# Patient Record
Sex: MALE | Race: WHITE | NOT HISPANIC OR LATINO | Employment: OTHER | URBAN - METROPOLITAN AREA
[De-identification: names, ages, dates, MRNs, and addresses within clinical notes are randomized per-mention and may not be internally consistent; named-entity substitution may affect disease eponyms.]

---

## 2017-04-05 ENCOUNTER — TELEPHONE (OUTPATIENT)
Dept: CARDIOLOGY | Facility: MEDICAL CENTER | Age: 72
End: 2017-04-05

## 2017-04-05 ENCOUNTER — OFFICE VISIT (OUTPATIENT)
Dept: CARDIOLOGY | Facility: CLINIC | Age: 72
End: 2017-04-05
Payer: MEDICARE

## 2017-04-05 VITALS
HEART RATE: 68 BPM | WEIGHT: 180 LBS | SYSTOLIC BLOOD PRESSURE: 100 MMHG | DIASTOLIC BLOOD PRESSURE: 58 MMHG | HEIGHT: 68 IN | BODY MASS INDEX: 27.28 KG/M2

## 2017-04-05 DIAGNOSIS — I48.0 PAROXYSMAL ATRIAL FIBRILLATION (HCC): ICD-10-CM

## 2017-04-05 DIAGNOSIS — I21.29 MYOCARDIAL INFARCTION INVOLVING OTHER CORONARY ARTERY: ICD-10-CM

## 2017-04-05 DIAGNOSIS — I25.10 CORONARY ARTERY DISEASE INVOLVING NATIVE CORONARY ARTERY OF NATIVE HEART WITHOUT ANGINA PECTORIS: ICD-10-CM

## 2017-04-05 DIAGNOSIS — I31.0: ICD-10-CM

## 2017-04-05 PROCEDURE — 3017F COLORECTAL CA SCREEN DOC REV: CPT | Mod: 8P | Performed by: INTERNAL MEDICINE

## 2017-04-05 PROCEDURE — G8598 ASA/ANTIPLAT THER USED: HCPCS | Performed by: INTERNAL MEDICINE

## 2017-04-05 PROCEDURE — G8432 DEP SCR NOT DOC, RNG: HCPCS | Performed by: INTERNAL MEDICINE

## 2017-04-05 PROCEDURE — 1036F TOBACCO NON-USER: CPT | Performed by: INTERNAL MEDICINE

## 2017-04-05 PROCEDURE — 4040F PNEUMOC VAC/ADMIN/RCVD: CPT | Performed by: INTERNAL MEDICINE

## 2017-04-05 PROCEDURE — G8419 CALC BMI OUT NRM PARAM NOF/U: HCPCS | Performed by: INTERNAL MEDICINE

## 2017-04-05 PROCEDURE — 1101F PT FALLS ASSESS-DOCD LE1/YR: CPT | Mod: 8P | Performed by: INTERNAL MEDICINE

## 2017-04-05 PROCEDURE — 99214 OFFICE O/P EST MOD 30 MIN: CPT | Performed by: INTERNAL MEDICINE

## 2017-04-05 ASSESSMENT — ENCOUNTER SYMPTOMS
EYES NEGATIVE: 1
COUGH: 0
BRUISES/BLEEDS EASILY: 0
DIZZINESS: 0
HEADACHES: 0
NERVOUS/ANXIOUS: 0
NAUSEA: 0
MYALGIAS: 0
INSOMNIA: 0
SHORTNESS OF BREATH: 0
PALPITATIONS: 0
HEARTBURN: 0
WEIGHT LOSS: 0
DEPRESSION: 0
LOSS OF CONSCIOUSNESS: 0

## 2017-04-05 NOTE — Clinical Note
[facility] [address]  Phone: [phone]  Fax: [phone]              Roger Chavira  1945    Encounter Date: 4/5/2017    Lauren Aleman M.D.          PROGRESS NOTE:  Subjective:   Roger Chavira is a 71 y.o. male who presents today in followup in regards to CAD with MI 2007 with RCA PCI and viral pericarditis 2016 with pAF.    Doing well , He and his wife are still living in [place]. They gave their flower store to their daughter. Enjoying their vacation. No setbacks. Taking meds as directed          Past Medical History   Diagnosis Date   • CAD (coronary artery disease) 11/15/2011   • MI (myocardial infarction) (CMS-[facility]) 11/15/2011   • Stented coronary artery 11/15/2011   • HTN (hypertension) 11/15/2011   • Dyslipidemia 11/15/2011   • Former smoker 11/15/2011     History reviewed. No pertinent past surgical history.  Family History   Problem Relation Age of Onset   • Heart Attack Neg Hx    • Heart Disease Neg Hx    • Heart Failure Neg Hx      History   Smoking status   • Former Smoker   • Quit date: 03/20/2007   Smokeless tobacco   • Never Used     Allergies   Allergen Reactions   • Hydrocodone Vomiting     Pt had vomiting when he took vicodin.       Outpatient Encounter Prescriptions as of 4/5/2017   Medication Sig Dispense Refill   • budesonide-formoterol (SYMBICORT) 160-4.5 MCG/ACT Aerosol Inhale 2 Puffs by mouth 2 Times a Day. 1 Inhaler 0   • albuterol (VENTOLIN OR PROVENTIL) 108 (90 BASE) MCG/ACT Aero Soln inhalation aerosol Inhale 2 Puffs by mouth every 6 hours as needed for Shortness of Breath. 8.5 g 3   • acyclovir (ZOVIRAX) 5 % Ointment Apply 1 Application to affected area(s) every 3 hours as needed.     • levofloxacin (LEVAQUIN) 750 MG tablet Take 750 mg by mouth every day. 5 day course started 2/23/16     • Famotidine-Ca Carb-Mag Hydrox -165 MG Chew Tab Take 1 Tab by mouth 1 time daily as needed.     "  • atorvastatin (LIPITOR) 10 MG Tab Take 10 mg by mouth every 48 hours.     • levothyroxine (SYNTHROID) 25 MCG Tab Take 25 mcg by mouth Every morning on an empty stomach.     • methotrexate (TREXALL) 2.5 MG TABS Take 7.5 mg by mouth every Saturday. 3 TABLETS     • aspirin 81 MG tablet Take 81 mg by mouth every day.     • Ramipril 10 MG TABS Take  by mouth every morning.     • alprazolam (XANAX) 0.5 MG TABS Take 0.5 mg by mouth 3 times a day as needed.       No facility-administered encounter medications on file as of 4/5/2017.     Review of Systems   Constitutional: Negative for weight loss and malaise/fatigue.   Eyes: Negative.    Respiratory: Negative for cough and shortness of breath.    Cardiovascular: Negative for chest pain, palpitations and leg swelling.   Gastrointestinal: Negative for heartburn and nausea.   Musculoskeletal: Positive for joint pain (stable with RA). Negative for myalgias.   Skin:        Still on meds for probable skin cancer on face   Neurological: Negative for dizziness, loss of consciousness and headaches.   Endo/Heme/Allergies: Does not bruise/bleed easily.   Psychiatric/Behavioral: Negative for depression. The patient is not nervous/anxious and does not have insomnia.    All other systems reviewed and are negative.       Objective:   /58 mmHg  Pulse 68  Ht 1.727 m (5' 8\")  Wt 81.647 kg (180 lb)  BMI 27.38 kg/m2    Physical Exam    Assessment:     1. Coronary artery disease involving native coronary artery of native heart without angina pectoris     2. Myocardial infarction involving other coronary artery (CMS-HCC)     3. Paroxysmal atrial fibrillation (CMS-HCC)     4. Chronic adhesive viral pericarditis         Medical Decision Making:  Today's Assessment / Status / Plan:     He got bloodwork today, I will call to get it.     Coronary disease, stable. Moderate disease on CAT scan 2016. Asymptomatic, On an aspirin, intolerant of statin    AF, quiescengt after pericarditis, " normal exam today    I looked at and read echo from today -- normal & reassuring    RTC 6m, sooner with concerns      Usama Frost M.D.  [address] [place] [id number]  VIA Facsimile: [phone]

## 2017-04-05 NOTE — PROGRESS NOTES
Subjective:   Roger Chavira is a 71 y.o. male who presents today in followup in regards to CAD with MI 2007 with RCA PCI and viral pericarditis 2016 with pAF.    Doing well , He and his wife are still living in [place]. They gave their flower store to their daughter. Enjoying their vacation. No setbacks. Taking meds as directed          Past Medical History   Diagnosis Date   • CAD (coronary artery disease) 11/15/2011   • MI (myocardial infarction) (CMS-[facility]) 11/15/2011   • Stented coronary artery 11/15/2011   • HTN (hypertension) 11/15/2011   • Dyslipidemia 11/15/2011   • Former smoker 11/15/2011     History reviewed. No pertinent past surgical history.  Family History   Problem Relation Age of Onset   • Heart Attack Neg Hx    • Heart Disease Neg Hx    • Heart Failure Neg Hx      History   Smoking status   • Former Smoker   • Quit date: 03/20/2007   Smokeless tobacco   • Never Used     Allergies   Allergen Reactions   • Hydrocodone Vomiting     Pt had vomiting when he took vicodin.       Outpatient Encounter Prescriptions as of 4/5/2017   Medication Sig Dispense Refill   • budesonide-formoterol (SYMBICORT) 160-4.5 MCG/ACT Aerosol Inhale 2 Puffs by mouth 2 Times a Day. 1 Inhaler 0   • albuterol (VENTOLIN OR PROVENTIL) 108 (90 BASE) MCG/ACT Aero Soln inhalation aerosol Inhale 2 Puffs by mouth every 6 hours as needed for Shortness of Breath. 8.5 g 3   • acyclovir (ZOVIRAX) 5 % Ointment Apply 1 Application to affected area(s) every 3 hours as needed.     • levofloxacin (LEVAQUIN) 750 MG tablet Take 750 mg by mouth every day. 5 day course started 2/23/16     • Famotidine-Ca Carb-Mag Hydrox -165 MG Chew Tab Take 1 Tab by mouth 1 time daily as needed.     • atorvastatin (LIPITOR) 10 MG Tab Take 10 mg by mouth every 48 hours.     • levothyroxine (SYNTHROID) 25 MCG Tab Take 25 mcg by mouth Every morning on an empty stomach.     • methotrexate (TREXALL) 2.5 MG TABS Take 7.5 mg by mouth every Saturday. 3 TABLETS     •  "aspirin 81 MG tablet Take 81 mg by mouth every day.     • Ramipril 10 MG TABS Take  by mouth every morning.     • alprazolam (XANAX) 0.5 MG TABS Take 0.5 mg by mouth 3 times a day as needed.       No facility-administered encounter medications on file as of 4/5/2017.     Review of Systems   Constitutional: Negative for weight loss and malaise/fatigue.   Eyes: Negative.    Respiratory: Negative for cough and shortness of breath.    Cardiovascular: Negative for chest pain, palpitations and leg swelling.   Gastrointestinal: Negative for heartburn and nausea.   Musculoskeletal: Positive for joint pain (stable with RA). Negative for myalgias.   Skin:        Still on meds for probable skin cancer on face   Neurological: Negative for dizziness, loss of consciousness and headaches.   Endo/Heme/Allergies: Does not bruise/bleed easily.   Psychiatric/Behavioral: Negative for depression. The patient is not nervous/anxious and does not have insomnia.    All other systems reviewed and are negative.       Objective:   /58 mmHg  Pulse 68  Ht 1.727 m (5' 8\")  Wt 81.647 kg (180 lb)  BMI 27.38 kg/m2    Physical Exam    Assessment:     1. Coronary artery disease involving native coronary artery of native heart without angina pectoris     2. Myocardial infarction involving other coronary artery (CMS-HCC)     3. Paroxysmal atrial fibrillation (CMS-HCC)     4. Chronic adhesive viral pericarditis         Medical Decision Making:  Today's Assessment / Status / Plan:     He got bloodwork today, I will call to get it.     Coronary disease, stable. Moderate disease on CAT scan 2016. Asymptomatic, On an aspirin, intolerant of statin    AF, quiescengt after pericarditis, normal exam today    I looked at and read echo from today -- normal & reassuring    RTC 6m, sooner with concerns  "

## 2017-04-07 NOTE — TELEPHONE ENCOUNTER
Called patient regarding lab results. He is not taking Lipitor because he had muscle aches on the medication. He also states that he cannot take Crestor, he took that medication years ago and he had muscle aches and weakness on that medication.    Forwarded to Dr. Aleman to review.    NESTOR ZHENG

## 2017-04-07 NOTE — TELEPHONE ENCOUNTER
Called patient and advised him that he does not need to start any new medication at this time.    NESTOR ZHENG

## 2017-04-07 NOTE — TELEPHONE ENCOUNTER
Labs are generally good unfortunately lipids and glucose are both high. He is he taking that very low dose of Lipitor? If possible, we could either switch to Crestor 2.5 every other day which would be stronger or daily. If so, we would need to take CPK lipids and liver function in 6 weeks    Thank you

## 2018-07-10 ENCOUNTER — TELEPHONE (OUTPATIENT)
Dept: CARDIOLOGY | Facility: MEDICAL CENTER | Age: 73
End: 2018-07-10

## 2018-07-10 NOTE — TELEPHONE ENCOUNTER
----- Message from Mike Chou sent at 7/10/2018  1:45 PM PDT -----  Regarding: needs referral sent to cardiologist in [place]   Contact: [phone]  [place]    Pt moved to [place] and needs a referral sent to a Cardiologist their. Pt would please like a call back at [phone].    ======================================================================    Called pt, per pt he does not need a referral, he just need to know if Dr Aleman know some Cardiologist from [facility] that she would recommend.     To Dr Aleman

## 2018-07-10 NOTE — TELEPHONE ENCOUNTER
HETAL Mccarty R.N.   Caller: Unspecified (Today,  2:13 PM)             Sorry no!!   Make sure who ever he gets has EPIC tho      ==============================================    Called pt and notified, pt verbalizes understanding

## 2021-03-08 ENCOUNTER — HOSPITAL ENCOUNTER (OUTPATIENT)
Dept: RADIOLOGY | Facility: MEDICAL CENTER | Age: 76
End: 2021-03-08
Attending: INTERNAL MEDICINE
Payer: MEDICARE

## 2021-03-08 DIAGNOSIS — C79.49 SECONDARY MALIGNANT NEOPLASM OF BRAIN AND SPINAL CORD (HCC): ICD-10-CM

## 2021-03-08 DIAGNOSIS — C79.31 SECONDARY MALIGNANT NEOPLASM OF BRAIN AND SPINAL CORD (HCC): ICD-10-CM

## 2021-03-08 PROCEDURE — 700117 HCHG RX CONTRAST REV CODE 255: Performed by: INTERNAL MEDICINE

## 2021-03-08 PROCEDURE — 70553 MRI BRAIN STEM W/O & W/DYE: CPT | Mod: MH

## 2021-03-08 PROCEDURE — A9576 INJ PROHANCE MULTIPACK: HCPCS | Performed by: INTERNAL MEDICINE

## 2021-03-08 RX ADMIN — GADOTERIDOL 18 ML: 279.3 INJECTION, SOLUTION INTRAVENOUS at 18:34

## 2021-03-09 ENCOUNTER — HOSPITAL ENCOUNTER (OUTPATIENT)
Dept: RADIOLOGY | Facility: MEDICAL CENTER | Age: 76
End: 2021-03-09
Attending: INTERNAL MEDICINE
Payer: MEDICARE

## 2021-03-09 DIAGNOSIS — C34.00 MALIGNANT NEOPLASM OF MAIN BRONCHUS, UNSPECIFIED LATERALITY (HCC): ICD-10-CM

## 2021-03-09 DIAGNOSIS — C79.31 SECONDARY MALIGNANT NEOPLASM OF BRAIN AND SPINAL CORD (HCC): ICD-10-CM

## 2021-03-09 DIAGNOSIS — C79.49 SECONDARY MALIGNANT NEOPLASM OF BRAIN AND SPINAL CORD (HCC): ICD-10-CM

## 2021-03-09 PROCEDURE — 78815 PET IMAGE W/CT SKULL-THIGH: CPT | Mod: MH

## 2021-09-10 ENCOUNTER — HOSPITAL ENCOUNTER (OUTPATIENT)
Dept: RADIOLOGY | Facility: MEDICAL CENTER | Age: 76
End: 2021-09-10
Attending: INTERNAL MEDICINE
Payer: MEDICARE

## 2021-09-10 DIAGNOSIS — C34.01 MALIGNANT NEOPLASM OF RIGHT MAIN BRONCHUS (HCC): ICD-10-CM

## 2021-09-10 PROCEDURE — A9576 INJ PROHANCE MULTIPACK: HCPCS | Performed by: INTERNAL MEDICINE

## 2021-09-10 PROCEDURE — 70553 MRI BRAIN STEM W/O & W/DYE: CPT | Mod: MH

## 2021-09-10 PROCEDURE — 700117 HCHG RX CONTRAST REV CODE 255: Performed by: INTERNAL MEDICINE

## 2021-09-10 RX ADMIN — GADOTERIDOL 20 ML: 279.3 INJECTION, SOLUTION INTRAVENOUS at 15:21

## 2021-12-06 ENCOUNTER — HOSPITAL ENCOUNTER (OUTPATIENT)
Dept: RADIOLOGY | Facility: MEDICAL CENTER | Age: 76
End: 2021-12-06
Attending: INTERNAL MEDICINE
Payer: MEDICARE

## 2021-12-06 DIAGNOSIS — C34.81 MALIGNANT NEOPLASM OF OVERLAPPING SITES OF RIGHT LUNG (HCC): ICD-10-CM

## 2021-12-06 PROCEDURE — A9576 INJ PROHANCE MULTIPACK: HCPCS | Performed by: INTERNAL MEDICINE

## 2021-12-06 PROCEDURE — 700117 HCHG RX CONTRAST REV CODE 255: Performed by: INTERNAL MEDICINE

## 2021-12-06 PROCEDURE — 70553 MRI BRAIN STEM W/O & W/DYE: CPT | Mod: MH

## 2021-12-06 RX ADMIN — GADOTERIDOL 18 ML: 279.3 INJECTION, SOLUTION INTRAVENOUS at 13:54

## 2021-12-13 ENCOUNTER — HOSPITAL ENCOUNTER (OUTPATIENT)
Dept: RADIOLOGY | Facility: MEDICAL CENTER | Age: 76
End: 2021-12-13
Attending: INTERNAL MEDICINE
Payer: MEDICARE

## 2021-12-30 ENCOUNTER — HOSPITAL ENCOUNTER (OUTPATIENT)
Dept: RADIATION ONCOLOGY | Facility: MEDICAL CENTER | Age: 76
End: 2021-12-31
Attending: RADIOLOGY
Payer: MEDICARE

## 2021-12-30 VITALS
SYSTOLIC BLOOD PRESSURE: 160 MMHG | BODY MASS INDEX: 29.65 KG/M2 | DIASTOLIC BLOOD PRESSURE: 73 MMHG | TEMPERATURE: 98 F | HEART RATE: 93 BPM | WEIGHT: 195 LBS | OXYGEN SATURATION: 94 %

## 2021-12-30 DIAGNOSIS — C79.31 BRAIN METASTASIS: ICD-10-CM

## 2021-12-30 PROBLEM — C34.90 SMALL CELL LUNG CANCER IN ADULT (HCC): Status: ACTIVE | Noted: 2021-02-20

## 2021-12-30 PROCEDURE — 99205 OFFICE O/P NEW HI 60 MIN: CPT | Performed by: RADIOLOGY

## 2021-12-30 RX ORDER — ERGOCALCIFEROL 1.25 MG/1
3000 CAPSULE ORAL DAILY
COMMUNITY

## 2021-12-30 RX ORDER — LOSARTAN POTASSIUM 100 MG/1
TABLET ORAL
COMMUNITY
Start: 2021-10-25

## 2021-12-30 RX ORDER — FAMOTIDINE 20 MG/1
10 TABLET, FILM COATED ORAL DAILY
COMMUNITY

## 2021-12-30 RX ORDER — ACETAMINOPHEN 500 MG
500-1000 TABLET ORAL EVERY 6 HOURS PRN
COMMUNITY

## 2021-12-30 RX ORDER — LOSARTAN POTASSIUM 50 MG/1
100 TABLET ORAL DAILY
COMMUNITY

## 2021-12-30 RX ORDER — HYDROXYCHLOROQUINE SULFATE 200 MG/1
200 TABLET, FILM COATED ORAL
COMMUNITY

## 2021-12-30 RX ORDER — PROCHLORPERAZINE MALEATE 10 MG
10 TABLET ORAL EVERY 6 HOURS PRN
COMMUNITY

## 2021-12-30 RX ORDER — ASCORBIC ACID 500 MG
500 TABLET ORAL
COMMUNITY

## 2021-12-30 ASSESSMENT — PAIN SCALES - GENERAL: PAINLEVEL: 5=MODERATE PAIN

## 2021-12-30 NOTE — PROGRESS NOTES
RADIATION ONCOLOGY CONSULT    Patient name:  Roger Chavira    Primary Physician:  Pcp Pt States None MRN: [id number]  [facility]: [id number]   Referring physician:  Christopher Zapata III, M.* : 1945, 76 y.o.     DATE OF SERVICE: 2021    IDENTIFICATION: A 76 y.o. male with   Small cell lung cancer in adult (HCC)  Staging form: Lung, AJCC 8th Edition  - Clinical stage from 2021: Stage IV (cTX, cN2, cM1) - Signed by Tripp Sumner M.D. on 2021    He is here at the kind request of Dr. Zapata    HISTORY OF PRESENT ILLNESS:   Patient is a pleasant 36-year-old male who presented [facility] also with shortness of breath hemoptysis 2021 and underwent liver biopsy 1621 with path consistent with small cell lung cancer.  He had CT head at admission which was negative metastatic disease.  He started on carbo etoposide and tecentriq 2021 and unfortunately had progression and started Lurbinectidin 2021 and most recently had progression on scan done in mid December in thoracic mediastinum and liver and was just started on topotecan.  MRI brain 2021 showed tiny enhancing cerebellar metastasis up to 4 mm size and probable 6 mm new lesion involving superior aspect of the tectum.  Patient denies any headaches, vomiting.  His biggest complaint is some right hip pain he does have a right iliac sclerotic lesion on his recent CT pelvis scan.    PROBLEM LIST:  Patient Active Problem List   Diagnosis   • CAD (coronary artery disease)   • MI (myocardial infarction) (HCC)   • Stented coronary artery   • HTN (hypertension)   • Dyslipidemia   • Former smoker   • Rheumatoid arthritis (HCC)   • Diastolic dysfunction   • Pericarditis   • COPD (chronic obstructive pulmonary disease) (HCC)   • BPH (benign prostatic hyperplasia)   • Hypothyroid   • AF (atrial fibrillation) (HCC)   • Brain metastasis (HCC)   • Small cell lung cancer in adult (HCC)        PAST SURGICAL  HISTORY:  History reviewed. No pertinent surgical history.    CURRENT MEDICATIONS:  Current Outpatient Medications   Medication Sig Dispense Refill   • losartan (COZAAR) 50 MG Tab Take 100 mg by mouth every day.     • prochlorperazine (COMPAZINE) 10 MG Tab Take 10 mg by mouth every 6 hours as needed.     • acetaminophen (TYLENOL) 500 MG Tab Take 500-1,000 mg by mouth every 6 hours as needed.     • acyclovir (ZOVIRAX) 5 % Ointment Apply 1 Application to affected area(s) every 3 hours as needed.     • Famotidine-Ca Carb-Mag Hydrox -165 MG Chew Tab Take 1 Tab by mouth 1 time daily as needed.     • levothyroxine (SYNTHROID) 25 MCG Tab Take 25 mcg by mouth Every morning on an empty stomach.     • losartan (COZAAR) 100 MG Tab      • hydroxychloroquine (PLAQUENIL) 200 MG Tab Take 200 mg by mouth.     • famotidine (PEPCID) 20 MG Tab Take 10 mg by mouth every day.     • ergocalciferol (DRISDOL) 94656 UNIT capsule Take 3,000 Units by mouth every day.     • ascorbic acid (VITAMIN C) 500 MG tablet Take 500 mg by mouth.       No current facility-administered medications for this encounter.       ALLERGIES:    Hydrocodone, Morphine, and Statins [hmg-coa-r inhibitors]    FAMILY HISTORY:    family history includes Cancer in his daughter.    SOCIAL HISTORY:     reports that he quit smoking about 14 years ago. He has never used smokeless tobacco. He reports previous alcohol use. He reports that he does not use drugs.   Pt resides in [place] with his wife.  Pt is currently retired.    REVIEW OF SYSTEMS:    A complete review of systems taken. Pertinent items in HPI.     PHYSICAL EXAM:    PERFORMANCE STATUS:  ECOG Performance Review 12/30/2021   ECOG Performance Status Ambulatory and capable of all selfcare but unable to carry out any work activities.  Up and about more than 50% of waking hours   Some recent data might be hidden     Karnofsky Score 12/30/2021   Karnofsky Score 70   Some recent data might be hidden     /73    Pulse 93   Temp 36.7 °C (98 °F)   Wt 88.5 kg (195 lb)   SpO2 94%   BMI 29.65 kg/m²   Physical Exam  Vitals reviewed.   HENT:      Head: Normocephalic.   Eyes:      Pupils: Pupils are equal, round, and reactive to light.   Cardiovascular:      Rate and Rhythm: Normal rate.   Pulmonary:      Effort: Pulmonary effort is normal.   Abdominal:      General: Abdomen is flat.   Musculoskeletal:         General: Normal range of motion.      Cervical back: Normal range of motion.   Skin:     General: Skin is warm.   Neurological:      General: No focal deficit present.      Mental Status: He is alert.   Psychiatric:         Mood and Affect: Mood normal.          LABORATORY DATA:   Lab Results   Component Value Date/Time    WBC 11.5 (H) 03/03/2016 03:29 AM    RBC 3.43 (L) 03/03/2016 03:29 AM    HEMOGLOBIN 10.4 (L) 03/03/2016 03:29 AM    HEMATOCRIT 32.6 (L) 03/03/2016 03:29 AM    MCV 95.0 03/03/2016 03:29 AM    MCH 30.3 03/03/2016 03:29 AM    MCHC 31.9 (L) 03/03/2016 03:29 AM    RDW 50.8 (H) 03/03/2016 03:29 AM    PLATELETCT 255 03/03/2016 03:29 AM    MPV 9.7 03/03/2016 03:29 AM    NEUTSPOLYS 75.40 (H) 03/02/2016 01:08 AM    LYMPHOCYTES 17.50 (L) 03/02/2016 01:08 AM    MONOCYTES 5.30 03/02/2016 01:08 AM    EOSINOPHILS 0.90 03/02/2016 01:08 AM    BASOPHILS 0.90 03/02/2016 01:08 AM      Lab Results   Component Value Date/Time    SODIUM 136 03/03/2016 01:54 AM    POTASSIUM 4.0 03/03/2016 01:54 AM    CHLORIDE 109 03/03/2016 01:54 AM    CO2 18 (L) 03/03/2016 01:54 AM    GLUCOSE 115 (H) 03/03/2016 01:54 AM    BUN 20 03/03/2016 01:54 AM    CREATININE 1.24 03/03/2016 01:54 AM    CREATININE 1.1 03/26/2007 04:20 AM           RADIOLOGY DATA:  MR-BRAIN-WITH & W/O    Result Date: 12/6/2021  1.  New tiny enhancing cerebellar metastases measuring up to 4 mm in size. 2.  Probable 6 mm new lesion involving/along the superior aspect of the tectum. 3.  Generalized cerebral atrophy and chronic microvascular ischemic changes again noted. 4.  No  acute infarct or hemorrhage.      IMPRESSION:    A 76 y.o. with  Small cell lung cancer in adult (HCC)  Staging form: Lung, AJCC 8th Edition  - Clinical stage from 12/30/2021: Stage IV (cTX, cN2, cM1) - Signed by Tripp Sumner M.D. on 12/30/2021        RECOMMENDATIONS:   I reviewed with patient his MRI brain which does show some small lesions under centimeter and he is asymptomatic so at this point I discussed that we could potentially closely observe these with MRI repeat in 2 months.  I also reviewed his staging CT chest abdomen pelvis which shows thoracic progression and a right iliac lesion which could ribs correspond to his right hip pain.  At this point given he is about to switch chemotherapy I would allow him to start new treatment and closely follow with pain progresses would consider palliative radiation for his right iliac lesion which may be associated his pain and possibly thoracic radiation if he continues to progress.  I mentioned the possibility that a PET CT could be helpful to see what is active in his bones to determine if that iliac lesion is the source of his pain on next restaging scan.    Thank you for the opportunity to participate in his care.  If any questions or comments, please do not hesitate in calling.    Orders Placed This Encounter   • MR-BRAIN-WITH & W/O   • REFERRAL TO ONCOLOGY NURSE NAVIGATOR   • losartan (COZAAR) 50 MG Tab   • prochlorperazine (COMPAZINE) 10 MG Tab   • acetaminophen (TYLENOL) 500 MG Tab   • losartan (COZAAR) 100 MG Tab   • hydroxychloroquine (PLAQUENIL) 200 MG Tab   • famotidine (PEPCID) 20 MG Tab   • ergocalciferol (DRISDOL) 03090 UNIT capsule   • ascorbic acid (VITAMIN C) 500 MG tablet       CC: dr. marmolejo

## 2021-12-30 NOTE — NON-PROVIDER
Patient was seen today in clinic with Dr. Sumner for consult.  Vitals signs and weight were obtained and pain assessment was completed.  Allergies and medications were reviewed with the patient.    Vitals/Pain:  Vitals:    12/30/21 1344   BP: 160/73   Pulse: 93   Temp: 36.7 °C (98 °F)   SpO2: 94%   Weight: 88.5 kg (195 lb)   Pain Score: 5=Moderate Pain        Allergies:   Hydrocodone, Morphine, and Statins [hmg-coa-r inhibitors]    Current Medications:  Current Outpatient Medications   Medication Sig Dispense Refill   • losartan (COZAAR) 50 MG Tab Take 100 mg by mouth every day.     • prochlorperazine (COMPAZINE) 10 MG Tab Take 10 mg by mouth every 6 hours as needed.     • acetaminophen (TYLENOL) 500 MG Tab Take 500-1,000 mg by mouth every 6 hours as needed.     • acyclovir (ZOVIRAX) 5 % Ointment Apply 1 Application to affected area(s) every 3 hours as needed.     • Famotidine-Ca Carb-Mag Hydrox -165 MG Chew Tab Take 1 Tab by mouth 1 time daily as needed.     • levothyroxine (SYNTHROID) 25 MCG Tab Take 25 mcg by mouth Every morning on an empty stomach.     • budesonide-formoterol (SYMBICORT) 160-4.5 MCG/ACT Aerosol Inhale 2 Puffs by mouth 2 Times a Day. 1 Inhaler 0   • albuterol (VENTOLIN OR PROVENTIL) 108 (90 BASE) MCG/ACT Aero Soln inhalation aerosol Inhale 2 Puffs by mouth every 6 hours as needed for Shortness of Breath. 8.5 g 3   • levofloxacin (LEVAQUIN) 750 MG tablet Take 750 mg by mouth every day. 5 day course started 2/23/16     • methotrexate (TREXALL) 2.5 MG TABS Take 7.5 mg by mouth every Saturday. 3 TABLETS     • aspirin 81 MG tablet Take 81 mg by mouth every day.     • Ramipril 10 MG TABS Take  by mouth every morning.     • alprazolam (XANAX) 0.5 MG TABS Take 0.5 mg by mouth 3 times a day as needed.       No current facility-administered medications for this encounter.         PCP:  Pcp Pt States None        Chey Schilling R.N.

## 2021-12-30 NOTE — CONSULTS
RADIATION ONCOLOGY CONSULT     Patient name:  Roger Chavira     Primary Physician:  Pcp Pt States None MRN: [id number]  [facility]: [id number]   Referring physician:  Christopher Zapata III, M.* : 1945, 76 y.o.      DATE OF SERVICE: 2021     IDENTIFICATION: A 76 y.o. male with   Small cell lung cancer in adult (HCC)  Staging form: Lung, AJCC 8th Edition  - Clinical stage from 2021: Stage IV (cTX, cN2, cM1) - Signed by Tripp Sumner M.D. on 2021     He is here at the kind request of Dr. Zapata     HISTORY OF PRESENT ILLNESS:   Patient is a pleasant 36-year-old male who presented [facility] also with shortness of breath hemoptysis 2021 and underwent liver biopsy 1621 with path consistent with small cell lung cancer.  He had CT head at admission which was negative metastatic disease.  He started on carbo etoposide and tecentriq 2021 and unfortunately had progression and started Lurbinectidin 2021 and most recently had progression on scan done in mid December in thoracic mediastinum and liver and was just started on topotecan.  MRI brain 2021 showed tiny enhancing cerebellar metastasis up to 4 mm size and probable 6 mm new lesion involving superior aspect of the tectum.  Patient denies any headaches, vomiting.  His biggest complaint is some right hip pain he does have a right iliac sclerotic lesion on his recent CT pelvis scan.     PROBLEM LIST:      Patient Active Problem List   Diagnosis   • CAD (coronary artery disease)   • MI (myocardial infarction) (HCC)   • Stented coronary artery   • HTN (hypertension)   • Dyslipidemia   • Former smoker   • Rheumatoid arthritis (HCC)   • Diastolic dysfunction   • Pericarditis   • COPD (chronic obstructive pulmonary disease) (HCC)   • BPH (benign prostatic hyperplasia)   • Hypothyroid   • AF (atrial fibrillation) (HCC)   • Brain metastasis (HCC)   • Small cell lung cancer in adult (HCC)         PAST SURGICAL  HISTORY:  Past Surgical History   History reviewed. No pertinent surgical history.        CURRENT MEDICATIONS:  Current Medications          Current Outpatient Medications   Medication Sig Dispense Refill   • losartan (COZAAR) 50 MG Tab Take 100 mg by mouth every day.       • prochlorperazine (COMPAZINE) 10 MG Tab Take 10 mg by mouth every 6 hours as needed.       • acetaminophen (TYLENOL) 500 MG Tab Take 500-1,000 mg by mouth every 6 hours as needed.       • acyclovir (ZOVIRAX) 5 % Ointment Apply 1 Application to affected area(s) every 3 hours as needed.       • Famotidine-Ca Carb-Mag Hydrox -165 MG Chew Tab Take 1 Tab by mouth 1 time daily as needed.       • levothyroxine (SYNTHROID) 25 MCG Tab Take 25 mcg by mouth Every morning on an empty stomach.       • losartan (COZAAR) 100 MG Tab         • hydroxychloroquine (PLAQUENIL) 200 MG Tab Take 200 mg by mouth.       • famotidine (PEPCID) 20 MG Tab Take 10 mg by mouth every day.       • ergocalciferol (DRISDOL) 46611 UNIT capsule Take 3,000 Units by mouth every day.       • ascorbic acid (VITAMIN C) 500 MG tablet Take 500 mg by mouth.          No current facility-administered medications for this encounter.            ALLERGIES:    Hydrocodone, Morphine, and Statins [hmg-coa-r inhibitors]     FAMILY HISTORY:    family history includes Cancer in his daughter.     SOCIAL HISTORY:     reports that he quit smoking about 14 years ago. He has never used smokeless tobacco. He reports previous alcohol use. He reports that he does not use drugs.   Pt resides in [place] with his wife.  Pt is currently retired.     REVIEW OF SYSTEMS:    A complete review of systems taken. Pertinent items in HPI.      PHYSICAL EXAM:    PERFORMANCE STATUS:  ECOG Performance Review 12/30/2021   ECOG Performance Status Ambulatory and capable of all selfcare but unable to carry out any work activities.  Up and about more than 50% of waking hours   Some recent data might be hidden      Karnofsky  Score 12/30/2021   Karnofsky Score 70   Some recent data might be hidden      /73   Pulse 93   Temp 36.7 °C (98 °F)   Wt 88.5 kg (195 lb)   SpO2 94%   BMI 29.65 kg/m²   Physical Exam  Vitals reviewed.   HENT:      Head: Normocephalic.   Eyes:      Pupils: Pupils are equal, round, and reactive to light.   Cardiovascular:      Rate and Rhythm: Normal rate.   Pulmonary:      Effort: Pulmonary effort is normal.   Abdominal:      General: Abdomen is flat.   Musculoskeletal:         General: Normal range of motion.      Cervical back: Normal range of motion.   Skin:     General: Skin is warm.   Neurological:      General: No focal deficit present.      Mental Status: He is alert.   Psychiatric:         Mood and Affect: Mood normal.            LABORATORY DATA:         Lab Results   Component Value Date/Time     WBC 11.5 (H) 03/03/2016 03:29 AM     RBC 3.43 (L) 03/03/2016 03:29 AM     HEMOGLOBIN 10.4 (L) 03/03/2016 03:29 AM     HEMATOCRIT 32.6 (L) 03/03/2016 03:29 AM     MCV 95.0 03/03/2016 03:29 AM     MCH 30.3 03/03/2016 03:29 AM     MCHC 31.9 (L) 03/03/2016 03:29 AM     RDW 50.8 (H) 03/03/2016 03:29 AM     PLATELETCT 255 03/03/2016 03:29 AM     MPV 9.7 03/03/2016 03:29 AM     NEUTSPOLYS 75.40 (H) 03/02/2016 01:08 AM     LYMPHOCYTES 17.50 (L) 03/02/2016 01:08 AM     MONOCYTES 5.30 03/02/2016 01:08 AM     EOSINOPHILS 0.90 03/02/2016 01:08 AM     BASOPHILS 0.90 03/02/2016 01:08 AM            Lab Results   Component Value Date/Time     SODIUM 136 03/03/2016 01:54 AM     POTASSIUM 4.0 03/03/2016 01:54 AM     CHLORIDE 109 03/03/2016 01:54 AM     CO2 18 (L) 03/03/2016 01:54 AM     GLUCOSE 115 (H) 03/03/2016 01:54 AM     BUN 20 03/03/2016 01:54 AM     CREATININE 1.24 03/03/2016 01:54 AM     CREATININE 1.1 03/26/2007 04:20 AM             RADIOLOGY DATA:  MR-BRAIN-WITH & W/O     Result Date: 12/6/2021  1.  New tiny enhancing cerebellar metastases measuring up to 4 mm in size. 2.  Probable 6 mm new lesion involving/along  the superior aspect of the tectum. 3.  Generalized cerebral atrophy and chronic microvascular ischemic changes again noted. 4.  No acute infarct or hemorrhage.        IMPRESSION:    A 76 y.o. with  Small cell lung cancer in adult (HCC)  Staging form: Lung, AJCC 8th Edition  - Clinical stage from 12/30/2021: Stage IV (cTX, cN2, cM1) - Signed by Tripp Sumner M.D. on 12/30/2021           RECOMMENDATIONS:   I reviewed with patient his MRI brain which does show some small lesions under centimeter and he is asymptomatic so at this point I discussed that we could potentially closely observe these with MRI repeat in 2 months.  I also reviewed his staging CT chest abdomen pelvis which shows thoracic progression and a right iliac lesion which could ribs correspond to his right hip pain.  At this point given he is about to switch chemotherapy I would allow him to start new treatment and closely follow with pain progresses would consider palliative radiation for his right iliac lesion which may be associated his pain and possibly thoracic radiation if he continues to progress.  I mentioned the possibility that a PET CT could be helpful to see what is active in his bones to determine if that iliac lesion is the source of his pain on next restaging scan.     Thank you for the opportunity to participate in his care.  If any questions or comments, please do not hesitate in calling.         Orders Placed This Encounter   • MR-BRAIN-WITH & W/O   • REFERRAL TO ONCOLOGY NURSE NAVIGATOR   • losartan (COZAAR) 50 MG Tab   • prochlorperazine (COMPAZINE) 10 MG Tab   • acetaminophen (TYLENOL) 500 MG Tab   • losartan (COZAAR) 100 MG Tab   • hydroxychloroquine (PLAQUENIL) 200 MG Tab   • famotidine (PEPCID) 20 MG Tab   • ergocalciferol (DRISDOL) 54805 UNIT capsule   • ascorbic acid (VITAMIN C) 500 MG tablet         CC: dr. [email or web address]

## 2021-12-30 NOTE — ADDENDUM NOTE
Encounter addended by: Tripp Sumner M.D. on: 12/30/2021 3:51 PM   Actions taken: Clinical Note Signed

## 2022-01-03 ENCOUNTER — PATIENT OUTREACH (OUTPATIENT)
Dept: OTHER | Facility: MEDICAL CENTER | Age: 77
End: 2022-01-03

## 2022-02-28 ENCOUNTER — APPOINTMENT (OUTPATIENT)
Dept: RADIOLOGY | Facility: MEDICAL CENTER | Age: 77
End: 2022-02-28
Attending: RADIOLOGY
Payer: MEDICARE

## 2022-03-03 ENCOUNTER — TELEPHONE (OUTPATIENT)
Dept: RADIATION ONCOLOGY | Facility: MEDICAL CENTER | Age: 77
End: 2022-03-03

## 2022-03-04 ENCOUNTER — APPOINTMENT (OUTPATIENT)
Dept: RADIATION ONCOLOGY | Facility: MEDICAL CENTER | Age: 77
End: 2022-03-04
Attending: RADIOLOGY
Payer: MEDICARE

## 2022-03-04 NOTE — TELEPHONE ENCOUNTER
I called pt and left a voicemail to inform him if his mri hasnt been done. We will have to reschedule his appointment for tomorrow .

## 2022-10-04 NOTE — PROGRESS NOTES
Referral received and call placed to patient.  He denies questions and right now no radiation treatment scheduled.  Will do MRI next month with f/u with Dr Sumner.  Pt did report does his chemo education at oncology office this Thursday.  Pt reports is able to get to appointments at [facility].  Nurse navigator available if needed and cancer treatment at [facility].   PRINCIPAL PROCEDURE  Procedure: Laparoscopic repair of hiatal hernia using mesh  Findings and Treatment: